# Patient Record
Sex: FEMALE | Race: ASIAN | NOT HISPANIC OR LATINO
[De-identification: names, ages, dates, MRNs, and addresses within clinical notes are randomized per-mention and may not be internally consistent; named-entity substitution may affect disease eponyms.]

---

## 2022-08-25 ENCOUNTER — DASHBOARD ENCOUNTERS (OUTPATIENT)
Age: 32
End: 2022-08-25

## 2022-08-25 ENCOUNTER — OFFICE VISIT (OUTPATIENT)
Dept: URBAN - METROPOLITAN AREA CLINIC 84 | Facility: CLINIC | Age: 32
End: 2022-08-25
Payer: MEDICARE

## 2022-08-25 ENCOUNTER — WEB ENCOUNTER (OUTPATIENT)
Dept: URBAN - METROPOLITAN AREA CLINIC 84 | Facility: CLINIC | Age: 32
End: 2022-08-25

## 2022-08-25 VITALS
HEART RATE: 88 BPM | WEIGHT: 122 LBS | HEIGHT: 60 IN | DIASTOLIC BLOOD PRESSURE: 66 MMHG | TEMPERATURE: 97.9 F | BODY MASS INDEX: 23.95 KG/M2 | SYSTOLIC BLOOD PRESSURE: 99 MMHG

## 2022-08-25 DIAGNOSIS — B19.10 HEP B W/O COMA: ICD-10-CM

## 2022-08-25 DIAGNOSIS — K59.04 CHRONIC IDIOPATHIC CONSTIPATION: ICD-10-CM

## 2022-08-25 DIAGNOSIS — R10.13 DYSPEPSIA: ICD-10-CM

## 2022-08-25 PROCEDURE — 99204 OFFICE O/P NEW MOD 45 MIN: CPT | Performed by: INTERNAL MEDICINE

## 2022-08-25 RX ORDER — LACTULOSE 10 G/15ML
30 ML SOLUTION ORAL
Qty: 1800 MILLILITER | Refills: 5 | OUTPATIENT
Start: 2022-08-25 | End: 2023-02-20

## 2022-08-25 RX ORDER — LACTULOSE 10 G/15ML
TAKE 30 MILLILITERS BY ORAL ROUTE 4 TIMES A DAY AS NEEDED SOLUTION ORAL
Qty: 1800 | Refills: 5 | Status: ACTIVE | COMMUNITY
Start: 2017-05-02

## 2022-08-25 RX ORDER — OMEPRAZOLE 40 MG/1
1 CAPSULE 30 MINUTES BEFORE MORNING MEAL CAPSULE, DELAYED RELEASE ORAL ONCE A DAY
Qty: 30 | Refills: 5 | OUTPATIENT
Start: 2022-08-25

## 2022-08-25 NOTE — HPI-TODAY'S VISIT:
The patient was referred by Dr. Sarah Mac for Hep B .   A copy of this document is being forwarded to the referring provider.  Patient last sen by me in 2018.  She always ahd low HBV DNA and never required therapy.  She was also being followed for dyspepsia and constipaiton.  I did EGD about 6 years ago that had mild gastritis but no HP infection.  She has been having intmermittent nausea.  She has a tightness in her upper abdomen.  She denies anreoxia or weight loss.  SHe has GERD.  She denies dysphagia.  She denies early satiety.  She ahd similar symptoms a few years ago when we did the EGD.  She has been having occasional constipation.  She is not on laxative therapy.  She has some blood on the tissue when she strains.  She has occasional rectal pain when she strains.  She has not had recent US or labs for her Hep B

## 2022-09-02 PROBLEM — 82934008 CHRONIC IDIOPATHIC CONSTIPATION: Status: ACTIVE | Noted: 2022-08-25

## 2022-09-02 LAB
AFP, SERUM: 0.7
AFP-L3%, SERUM: (no result)
ALBUMIN/GLOBULIN RATIO: 1.3
ALBUMIN: 4.4
ALKALINE PHOSPHATASE: 74
ALT (SGPT): 37
AST (SGOT): 26
BILIRUBIN, DIRECT: 0.1
BILIRUBIN, INDIRECT: 0.3
BILIRUBIN, TOTAL: 0.4
BUN/CREATININE RATIO: (no result)
CALCIUM: 9.5
CARBON DIOXIDE: 25
CHLORIDE: 103
CREATININE: 0.83
EGFR: 97
GLOBULIN: 3.5
GLUCOSE: 93
HEP BE AB: REACTIVE
HEP BE AG: (no result)
HEPATITIS B VIRUS DNA: 1.23
HEPATITIS B VIRUS DNA: 17
POTASSIUM: 4.1
PROTEIN, TOTAL: 7.9
SODIUM: 137
UREA NITROGEN (BUN): 12

## 2022-12-10 PROBLEM — 111891008 VIRAL HEPATITIS B WITHOUT HEPATIC COMA: Status: ACTIVE | Noted: 2022-08-25

## 2022-12-12 ENCOUNTER — OFFICE VISIT (OUTPATIENT)
Dept: URBAN - METROPOLITAN AREA CLINIC 83 | Facility: CLINIC | Age: 32
End: 2022-12-12
Payer: MEDICARE

## 2022-12-12 DIAGNOSIS — K76.0 FATTY (CHANGE OF) LIVER: ICD-10-CM

## 2022-12-12 PROCEDURE — 76705 ECHO EXAM OF ABDOMEN: CPT | Performed by: INTERNAL MEDICINE

## 2022-12-12 RX ORDER — LACTULOSE 10 G/15ML
TAKE 30 MILLILITERS BY ORAL ROUTE 4 TIMES A DAY AS NEEDED SOLUTION ORAL
Qty: 1800 | Refills: 5 | Status: ACTIVE | COMMUNITY
Start: 2017-05-02

## 2022-12-12 RX ORDER — OMEPRAZOLE 40 MG/1
1 CAPSULE 30 MINUTES BEFORE MORNING MEAL CAPSULE, DELAYED RELEASE ORAL ONCE A DAY
Qty: 30 | Refills: 5 | Status: ACTIVE | COMMUNITY
Start: 2022-08-25

## 2022-12-12 RX ORDER — LACTULOSE 10 G/15ML
30 ML SOLUTION ORAL
Qty: 1800 MILLILITER | Refills: 5 | Status: ACTIVE | COMMUNITY
Start: 2022-08-25 | End: 2023-02-20

## 2022-12-14 PROBLEM — 197321007 FATTY LIVER: Status: ACTIVE | Noted: 2022-12-14

## 2024-02-01 NOTE — PHYSICAL EXAM SKIN:
no rashes , no suspicious lesions , no areas of discoloration , no jaundice present , good turgor , no masses , no tenderness on palpation Warm